# Patient Record
Sex: MALE | Race: WHITE | NOT HISPANIC OR LATINO | Employment: FULL TIME | URBAN - METROPOLITAN AREA
[De-identification: names, ages, dates, MRNs, and addresses within clinical notes are randomized per-mention and may not be internally consistent; named-entity substitution may affect disease eponyms.]

---

## 2020-11-16 ENCOUNTER — NURSE TRIAGE (OUTPATIENT)
Dept: HEALTH INFORMATION MANAGEMENT | Facility: OTHER | Age: 30
End: 2020-11-16

## 2020-11-16 NOTE — TELEPHONE ENCOUNTER
"RIGHT half of face was numb yesterday.  He has a cavity on top back molar on RIGHT side.  Patient cannot close eye on RIGHT side.    Patient states it feels like \"I got numbness that feels like I had a shot at the dentist\".    Patient is reporting tightness in RIGHT side Jaw and around the head on the RIGHT side.      Reason for Disposition  • New neurologic deficit that is present NOW, sudden onset of ANY of the following: * Weakness of the face, arm, or leg on one side of the body * Numbness of the face, arm, or leg on one side of the body * Loss of speech or garbled speech    Additional Information  • Negative: Difficult to awaken or acting confused (e.g., disoriented, slurred speech)    Answer Assessment - Initial Assessment Questions  1. SYMPTOM: \"What is the main symptom you are concerned about?\" (e.g., weakness, numbness)      Numbness on RIGHT side of face  2. ONSET: \"When did this start?\" (minutes, hours, days; while sleeping)      Symptoms began yesterday morning  3. LAST NORMAL: \"When was the last time you were normal (no symptoms)?\"      The day before yesterday  4. PATTERN \"Does this come and go, or has it been constant since it started?\"  \"Is it present now?\"      no  5. CARDIAC SYMPTOMS: \"Have you had any of the following symptoms: chest pain, difficulty breathing, palpitations?\"      no  6. NEUROLOGIC SYMPTOMS: \"Have you had any of the following symptoms: headache, dizziness, vision loss, double vision, changes in speech, unsteady on your feet?\"      Numbness RIGHT side of face is less response.  7. OTHER SYMPTOMS: \"Do you have any other symptoms?\"      Tooth on RIGHT back is painful  8. PREGNANCY: \"Is there any chance you are pregnant?\" \"When was your last menstrual period?\"      NA    Protocols used: NEUROLOGIC DEFICIT-A-[place]      "

## 2021-01-26 PROBLEM — F90.1 ATTENTION DEFICIT HYPERACTIVITY DISORDER (ADHD), PREDOMINANTLY HYPERACTIVE TYPE: Status: ACTIVE | Noted: 2021-01-26

## 2021-01-26 PROBLEM — Z02.83 ENCOUNTER FOR DRUG SCREENING: Status: ACTIVE | Noted: 2021-01-26

## 2021-02-08 PROBLEM — Z79.899 CONTROLLED SUBSTANCE AGREEMENT SIGNED: Status: ACTIVE | Noted: 2021-02-08

## 2022-08-30 PROBLEM — F41.0 PANIC ATTACKS: Status: ACTIVE | Noted: 2022-08-30

## 2022-08-30 PROBLEM — I10 BENIGN ESSENTIAL HTN: Status: ACTIVE | Noted: 2022-08-30

## 2023-12-13 PROBLEM — E78.5 DYSLIPIDEMIA: Status: ACTIVE | Noted: 2023-12-13

## 2023-12-13 PROBLEM — F17.200 NICOTINE DEPENDENCE: Status: ACTIVE | Noted: 2023-12-13

## 2023-12-13 PROBLEM — F33.1 MODERATE EPISODE OF RECURRENT MAJOR DEPRESSIVE DISORDER (HCC): Status: ACTIVE | Noted: 2023-12-13

## 2023-12-13 PROBLEM — F51.04 CHRONIC INSOMNIA: Status: ACTIVE | Noted: 2023-12-13

## 2024-02-05 PROBLEM — Z91.148 CONTROLLED SUBSTANCE AGREEMENT TERMINATED: Status: ACTIVE | Noted: 2021-02-08
